# Patient Record
Sex: FEMALE | Race: WHITE | NOT HISPANIC OR LATINO | URBAN - METROPOLITAN AREA
[De-identification: names, ages, dates, MRNs, and addresses within clinical notes are randomized per-mention and may not be internally consistent; named-entity substitution may affect disease eponyms.]

---

## 2022-06-04 ENCOUNTER — EMERGENCY (EMERGENCY)
Facility: HOSPITAL | Age: 22
LOS: 1 days | Discharge: HOME | End: 2022-06-04
Attending: EMERGENCY MEDICINE
Payer: COMMERCIAL

## 2022-06-04 VITALS
SYSTOLIC BLOOD PRESSURE: 127 MMHG | WEIGHT: 119.93 LBS | OXYGEN SATURATION: 98 % | TEMPERATURE: 99 F | DIASTOLIC BLOOD PRESSURE: 87 MMHG | HEIGHT: 60 IN | RESPIRATION RATE: 18 BRPM | HEART RATE: 74 BPM

## 2022-06-04 LAB
ALBUMIN SERPL ELPH-MCNC: 4.5 G/DL — SIGNIFICANT CHANGE UP (ref 3.3–5.2)
ALP SERPL-CCNC: 52 U/L — SIGNIFICANT CHANGE UP (ref 40–120)
ALT FLD-CCNC: 10 U/L — SIGNIFICANT CHANGE UP
ANION GAP SERPL CALC-SCNC: 10 MMOL/L — SIGNIFICANT CHANGE UP (ref 5–17)
APTT BLD: 30.6 SEC — SIGNIFICANT CHANGE UP (ref 27.5–35.5)
AST SERPL-CCNC: 17 U/L — SIGNIFICANT CHANGE UP
BASOPHILS # BLD AUTO: 0 K/UL — SIGNIFICANT CHANGE UP (ref 0–0.2)
BASOPHILS NFR BLD AUTO: 0 % — SIGNIFICANT CHANGE UP (ref 0–2)
BILIRUB SERPL-MCNC: 0.4 MG/DL — SIGNIFICANT CHANGE UP (ref 0.4–2)
BUN SERPL-MCNC: 10.1 MG/DL — SIGNIFICANT CHANGE UP (ref 8–20)
CALCIUM SERPL-MCNC: 9.1 MG/DL — SIGNIFICANT CHANGE UP (ref 8.6–10.2)
CHLORIDE SERPL-SCNC: 101 MMOL/L — SIGNIFICANT CHANGE UP (ref 98–107)
CO2 SERPL-SCNC: 26 MMOL/L — SIGNIFICANT CHANGE UP (ref 22–29)
CREAT SERPL-MCNC: 0.57 MG/DL — SIGNIFICANT CHANGE UP (ref 0.5–1.3)
EGFR: 131 ML/MIN/1.73M2 — SIGNIFICANT CHANGE UP
EOSINOPHIL # BLD AUTO: 0 K/UL — SIGNIFICANT CHANGE UP (ref 0–0.5)
EOSINOPHIL NFR BLD AUTO: 0 % — SIGNIFICANT CHANGE UP (ref 0–6)
GIANT PLATELETS BLD QL SMEAR: PRESENT — SIGNIFICANT CHANGE UP
GLUCOSE SERPL-MCNC: 101 MG/DL — HIGH (ref 70–99)
HCG SERPL-ACNC: <4 MIU/ML — SIGNIFICANT CHANGE UP
HCT VFR BLD CALC: 38.7 % — SIGNIFICANT CHANGE UP (ref 34.5–45)
HGB BLD-MCNC: 13.1 G/DL — SIGNIFICANT CHANGE UP (ref 11.5–15.5)
INR BLD: 1.08 RATIO — SIGNIFICANT CHANGE UP (ref 0.88–1.16)
LYMPHOCYTES # BLD AUTO: 2.33 K/UL — SIGNIFICANT CHANGE UP (ref 1–3.3)
LYMPHOCYTES # BLD AUTO: 27.8 % — SIGNIFICANT CHANGE UP (ref 13–44)
MANUAL SMEAR VERIFICATION: SIGNIFICANT CHANGE UP
MCHC RBC-ENTMCNC: 30.1 PG — SIGNIFICANT CHANGE UP (ref 27–34)
MCHC RBC-ENTMCNC: 33.9 GM/DL — SIGNIFICANT CHANGE UP (ref 32–36)
MCV RBC AUTO: 89 FL — SIGNIFICANT CHANGE UP (ref 80–100)
MONOCYTES # BLD AUTO: 0.29 K/UL — SIGNIFICANT CHANGE UP (ref 0–0.9)
MONOCYTES NFR BLD AUTO: 3.5 % — SIGNIFICANT CHANGE UP (ref 2–14)
NEUTROPHILS # BLD AUTO: 5.62 K/UL — SIGNIFICANT CHANGE UP (ref 1.8–7.4)
NEUTROPHILS NFR BLD AUTO: 67 % — SIGNIFICANT CHANGE UP (ref 43–77)
PLAT MORPH BLD: NORMAL — SIGNIFICANT CHANGE UP
PLATELET # BLD AUTO: 312 K/UL — SIGNIFICANT CHANGE UP (ref 150–400)
POTASSIUM SERPL-MCNC: 3.7 MMOL/L — SIGNIFICANT CHANGE UP (ref 3.5–5.3)
POTASSIUM SERPL-SCNC: 3.7 MMOL/L — SIGNIFICANT CHANGE UP (ref 3.5–5.3)
PROT SERPL-MCNC: 7.6 G/DL — SIGNIFICANT CHANGE UP (ref 6.6–8.7)
PROTHROM AB SERPL-ACNC: 12.5 SEC — SIGNIFICANT CHANGE UP (ref 10.5–13.4)
RBC # BLD: 4.35 M/UL — SIGNIFICANT CHANGE UP (ref 3.8–5.2)
RBC # FLD: 11.9 % — SIGNIFICANT CHANGE UP (ref 10.3–14.5)
RBC BLD AUTO: NORMAL — SIGNIFICANT CHANGE UP
SODIUM SERPL-SCNC: 137 MMOL/L — SIGNIFICANT CHANGE UP (ref 135–145)
TSH SERPL-MCNC: 1.01 UIU/ML — SIGNIFICANT CHANGE UP (ref 0.27–4.2)
VARIANT LYMPHS # BLD: 1.7 % — SIGNIFICANT CHANGE UP (ref 0–6)
WBC # BLD: 8.39 K/UL — SIGNIFICANT CHANGE UP (ref 3.8–10.5)
WBC # FLD AUTO: 8.39 K/UL — SIGNIFICANT CHANGE UP (ref 3.8–10.5)

## 2022-06-04 PROCEDURE — 76856 US EXAM PELVIC COMPLETE: CPT | Mod: 26

## 2022-06-04 PROCEDURE — 99285 EMERGENCY DEPT VISIT HI MDM: CPT

## 2022-06-04 RX ORDER — SODIUM CHLORIDE 9 MG/ML
1000 INJECTION INTRAMUSCULAR; INTRAVENOUS; SUBCUTANEOUS ONCE
Refills: 0 | Status: COMPLETED | OUTPATIENT
Start: 2022-06-04 | End: 2022-06-04

## 2022-06-04 RX ORDER — METOCLOPRAMIDE HCL 10 MG
10 TABLET ORAL ONCE
Refills: 0 | Status: COMPLETED | OUTPATIENT
Start: 2022-06-04 | End: 2022-06-04

## 2022-06-04 RX ORDER — KETOROLAC TROMETHAMINE 30 MG/ML
30 SYRINGE (ML) INJECTION ONCE
Refills: 0 | Status: DISCONTINUED | OUTPATIENT
Start: 2022-06-04 | End: 2022-06-04

## 2022-06-04 RX ADMIN — Medication 30 MILLIGRAM(S): at 21:29

## 2022-06-04 RX ADMIN — Medication 10 MILLIGRAM(S): at 20:59

## 2022-06-04 RX ADMIN — SODIUM CHLORIDE 1000 MILLILITER(S): 9 INJECTION INTRAMUSCULAR; INTRAVENOUS; SUBCUTANEOUS at 22:03

## 2022-06-04 RX ADMIN — Medication 30 MILLIGRAM(S): at 20:59

## 2022-06-04 RX ADMIN — SODIUM CHLORIDE 1000 MILLILITER(S): 9 INJECTION INTRAMUSCULAR; INTRAVENOUS; SUBCUTANEOUS at 21:03

## 2022-06-04 NOTE — ED ADULT NURSE REASSESSMENT NOTE - NS ED NURSE REASSESS COMMENT FT1
Pt awake, resting comfortably, stated her pain is much better after the medication, AOx4, respirations even and unlabored on RA, skin warm and dry. Pt waiting for US and for UA

## 2022-06-04 NOTE — ED STATDOCS - PROGRESS NOTE DETAILS
Brittaney Fraser PA :  PT evaluated by intake physician. HPI/PE/ROS as noted above. Will follow up plan per intake physician  pt still c/o pelvic/ RLQ pain after US pelvis relatively unremarkable  CT obtained due to persistent pain, negative for appy. +L ovarian cyst/follicle, small free fluid, ?rupture. pt feeling slightly improved will dc with nsaids, abx for UTI and gyn f/u

## 2022-06-04 NOTE — ED STATDOCS - RESPIRATORY, MLM
Previous Labs: No How Did The Hair Loss Occur?: sudden in onset How Severe Is Your Hair Loss?: moderate breath sounds clear and equal bilaterally.

## 2022-06-04 NOTE — ED STATDOCS - CARE PROVIDER_API CALL
Russ Magallon)  Obstetrics and Gynecology  [address], [address]  [place] [address]  Phone: [phone]  Fax: [phone]  Follow Up Time:

## 2022-06-04 NOTE — ED ADULT TRIAGE NOTE - CHIEF COMPLAINT QUOTE
patient states that she was seen at  sent for evaluation of right lower abdominal pain with nausea started on Thursday

## 2022-06-04 NOTE — ED STATDOCS - CLINICAL SUMMARY MEDICAL DECISION MAKING FREE TEXT BOX
Yes
ovarian cyst vs UTI. Will get labs including pregnancy test, ultrasound, give Toradol and Reglan. Will also get a urine culture due to +urine at urgent care.

## 2022-06-04 NOTE — ED STATDOCS - NSFOLLOWUPINSTRUCTIONS_ED_ALL_ED_FT
Please take antibiotics for UTI  Take ibuprofen 800mg TID for pain  Please follow up with OBGYN in 2-3 days  Return to ER for any worsening pain, fevers, vomiting or other new/worsening symptoms

## 2022-06-04 NOTE — ED STATDOCS - PATIENT PORTAL LINK FT
You can access the FollowMyHealth Patient Portal offered by [facility] by registering at the following website: [email or web address]. By joining [email or web address]’s FollowMyHealth portal, you will also be able to view your health information using other applications (apps) compatible with our system.

## 2022-06-04 NOTE — ED ADULT NURSE NOTE - OBJECTIVE STATEMENT
c/o RLQ pain x 4 days./ Pt stated she thought it was period cramps and took motrin but the pain has not subsided, and nausea started today. Pt denies V/D/C, HA, SOB, CP. Pt AOx4, speaking coherently, respirations even and unlabored on Ra, skin warm and dry, abd soft, ND, tender in right and left lower quadrants. c/o RLQ pain x 4 days./ Pt stated she thought it was period cramps and took motrin but the pain has not subsided, and nausea started today. Pt denies V/D/C, HA, SOB, CP, LMP 2 weeks ago. Pt AOx4, speaking coherently, respirations even and unlabored on Ra, skin warm and dry, abd soft, ND, tender in right and left lower quadrants.

## 2022-06-05 VITALS
DIASTOLIC BLOOD PRESSURE: 70 MMHG | HEART RATE: 70 BPM | OXYGEN SATURATION: 99 % | SYSTOLIC BLOOD PRESSURE: 104 MMHG | TEMPERATURE: 98 F | RESPIRATION RATE: 18 BRPM

## 2022-06-05 LAB
APPEARANCE UR: ABNORMAL
BACTERIA # UR AUTO: ABNORMAL
BILIRUB UR-MCNC: NEGATIVE — SIGNIFICANT CHANGE UP
COLOR SPEC: YELLOW — SIGNIFICANT CHANGE UP
DIFF PNL FLD: ABNORMAL
EPI CELLS # UR: ABNORMAL
GLUCOSE UR QL: NEGATIVE MG/DL — SIGNIFICANT CHANGE UP
KETONES UR-MCNC: ABNORMAL
LEUKOCYTE ESTERASE UR-ACNC: ABNORMAL
NITRITE UR-MCNC: NEGATIVE — SIGNIFICANT CHANGE UP
PH UR: 5 — SIGNIFICANT CHANGE UP (ref 5–8)
PROT UR-MCNC: NEGATIVE — SIGNIFICANT CHANGE UP
RBC CASTS # UR COMP ASSIST: ABNORMAL /HPF (ref 0–4)
SP GR SPEC: 1.02 — SIGNIFICANT CHANGE UP (ref 1.01–1.02)
UROBILINOGEN FLD QL: NEGATIVE MG/DL — SIGNIFICANT CHANGE UP
WBC UR QL: ABNORMAL /HPF (ref 0–5)

## 2022-06-05 PROCEDURE — 96361 HYDRATE IV INFUSION ADD-ON: CPT

## 2022-06-05 PROCEDURE — 96374 THER/PROPH/DIAG INJ IV PUSH: CPT

## 2022-06-05 PROCEDURE — 36415 COLL VENOUS BLD VENIPUNCTURE: CPT

## 2022-06-05 PROCEDURE — 85610 PROTHROMBIN TIME: CPT

## 2022-06-05 PROCEDURE — 74177 CT ABD & PELVIS W/CONTRAST: CPT | Mod: MA

## 2022-06-05 PROCEDURE — 85025 COMPLETE CBC W/AUTO DIFF WBC: CPT

## 2022-06-05 PROCEDURE — 80053 COMPREHEN METABOLIC PANEL: CPT

## 2022-06-05 PROCEDURE — 74177 CT ABD & PELVIS W/CONTRAST: CPT | Mod: 26,MA

## 2022-06-05 PROCEDURE — 96375 TX/PRO/DX INJ NEW DRUG ADDON: CPT

## 2022-06-05 PROCEDURE — 84702 CHORIONIC GONADOTROPIN TEST: CPT

## 2022-06-05 PROCEDURE — 85730 THROMBOPLASTIN TIME PARTIAL: CPT

## 2022-06-05 PROCEDURE — 81001 URINALYSIS AUTO W/SCOPE: CPT

## 2022-06-05 PROCEDURE — 76856 US EXAM PELVIC COMPLETE: CPT

## 2022-06-05 PROCEDURE — 84443 ASSAY THYROID STIM HORMONE: CPT

## 2022-06-05 PROCEDURE — 99284 EMERGENCY DEPT VISIT MOD MDM: CPT | Mod: 25

## 2022-06-05 PROCEDURE — 87086 URINE CULTURE/COLONY COUNT: CPT

## 2022-06-05 RX ORDER — IBUPROFEN 200 MG
1 TABLET ORAL
Qty: 20 | Refills: 0
Start: 2022-06-05

## 2022-06-05 RX ORDER — CEPHALEXIN 500 MG
1 CAPSULE ORAL
Qty: 14 | Refills: 0
Start: 2022-06-05 | End: 2022-06-11

## 2022-06-05 NOTE — ED ADULT NURSE REASSESSMENT NOTE - NS ED NURSE REASSESS COMMENT FT1
Pt sleeping, respirations even and unlabored on RA. All tests have resulted, CT ordered at this time. Pt awaiting Ct scan. Mother remains at bedside.

## 2022-06-06 LAB
CULTURE RESULTS: SIGNIFICANT CHANGE UP
SPECIMEN SOURCE: SIGNIFICANT CHANGE UP

## 2024-05-22 NOTE — ED STATDOCS - OBJECTIVE STATEMENT
THEODORE REDD is a 16y female PMH anorexia nervosa, depression/anxiety who presented to [facility] ED with malnutrition in the setting of disordered eating; admitted for failure of outpatient management. VS notable for bradycardia overnight to a low of 39 BPM and being orthostatic by HR. The patient is at risk of refeeding syndrome given longstanding malnourished state; will require monitoring while slowly advancing on calories. Will keep pt on telemetry due to risk of bradycardia. Pt is on k phos for refeeding prophylaxis; electrolytes have been stable. CBC with mildly elevated reactive lymphocytes, will repeat. Pt tolerated breakfast 5/21; can d/c IV fluids.  THEODORE REDD is a 16y female PMH anorexia nervosa, depression/anxiety who presented to [facility] ED with malnutrition in the setting of disordered eating; admitted for failure of outpatient management. VS notable for bradycardia overnight to a low of 41 BPM. The patient is at risk of refeeding syndrome given longstanding malnourished state; will require monitoring while slowly advancing on calories. Will keep pt on telemetry due to risk of bradycardia. Pt is on k phos for refeeding prophylaxis; electrolytes have been stable. Repeat CBC 5/22 WAL.     The patient is admitted for malnutrition and eating disorder; the treatment plan will include both medical and psychiatric care.  23 y/o female with no PMHx c/o abdominal pain. Pt reports abdominal cramping pain since Thursday (3 days). Pt initially thought this pain was just menstrual pain but the pain was never this severe. Pt was seen at Urgent Care and had a +urine there. Denies hx of ovarian cyst or fibroids. Denies OCP use. Pt has a FHx of ovarian cyst (mother)